# Patient Record
Sex: MALE
[De-identification: names, ages, dates, MRNs, and addresses within clinical notes are randomized per-mention and may not be internally consistent; named-entity substitution may affect disease eponyms.]

---

## 2019-04-25 PROBLEM — Z00.00 ENCOUNTER FOR PREVENTIVE HEALTH EXAMINATION: Status: ACTIVE | Noted: 2019-04-25

## 2020-05-02 ENCOUNTER — TRANSCRIPTION ENCOUNTER (OUTPATIENT)
Age: 45
End: 2020-05-02

## 2022-11-21 ENCOUNTER — APPOINTMENT (OUTPATIENT)
Dept: PULMONOLOGY | Facility: CLINIC | Age: 47
End: 2022-11-21

## 2022-11-21 VITALS
HEIGHT: 67 IN | HEART RATE: 74 BPM | SYSTOLIC BLOOD PRESSURE: 103 MMHG | DIASTOLIC BLOOD PRESSURE: 71 MMHG | OXYGEN SATURATION: 97 % | BODY MASS INDEX: 28.25 KG/M2 | RESPIRATION RATE: 14 BRPM | WEIGHT: 180 LBS | TEMPERATURE: 98.2 F

## 2022-11-21 DIAGNOSIS — R05.3 CHRONIC COUGH: ICD-10-CM

## 2022-11-21 DIAGNOSIS — N40.0 BENIGN PROSTATIC HYPERPLASIA WITHOUT LOWER URINARY TRACT SYMPMS: ICD-10-CM

## 2022-11-21 DIAGNOSIS — L40.9 PSORIASIS, UNSPECIFIED: ICD-10-CM

## 2022-11-21 DIAGNOSIS — Z78.9 OTHER SPECIFIED HEALTH STATUS: ICD-10-CM

## 2022-11-21 DIAGNOSIS — Z92.89 PERSONAL HISTORY OF OTHER MEDICAL TREATMENT: ICD-10-CM

## 2022-11-21 DIAGNOSIS — Z80.7 FAMILY HISTORY OF OTHER MALIGNANT NEOPLASMS OF LYMPHOID, HEMATOPOIETIC AND RELATED TISSUES: ICD-10-CM

## 2022-11-21 DIAGNOSIS — E78.5 HYPERLIPIDEMIA, UNSPECIFIED: ICD-10-CM

## 2022-11-21 PROCEDURE — 99203 OFFICE O/P NEW LOW 30 MIN: CPT | Mod: 25

## 2022-11-21 PROCEDURE — 94060 EVALUATION OF WHEEZING: CPT

## 2022-11-21 PROCEDURE — 95012 NITRIC OXIDE EXP GAS DETER: CPT

## 2022-11-21 PROCEDURE — 94729 DIFFUSING CAPACITY: CPT

## 2022-11-21 PROCEDURE — 94726 PLETHYSMOGRAPHY LUNG VOLUMES: CPT

## 2022-11-21 RX ORDER — TAMSULOSIN HYDROCHLORIDE 0.4 MG/1
0.4 CAPSULE ORAL
Refills: 0 | Status: ACTIVE | COMMUNITY

## 2022-11-21 NOTE — REASON FOR VISIT
[Cough] : cough [Consultation] : a consultation [Asthma] : asthma [Shortness of Breath] : shortness of breath

## 2022-11-21 NOTE — ASSESSMENT
[FreeTextEntry1] : In summary the patient is a 47-year-old male with past medical history significant for psoriasis obstructive sleep apnea currently on CPAP who presents for pulmonary consult.  The patient's physical exam is within normal limits.\par \par He underwent a pulmonary function test which revealed mild hyperinflation.  FeNO was 26 ppb.  The patient is started on Trelegy for possible intermittent asthma and is instructed to follow-up in 2 weeks

## 2022-11-21 NOTE — HISTORY OF PRESENT ILLNESS
[Never] : never [TextBox_4] : Patient is a 47 year old male with history of BPH, Hyperlipidemia, Psoriasis, +ve PPD treated with INH about 10 years ago and Sleep apnea on CPAP at night presents for pulmonary evaluation \par \par Patient states he has been experiencing cough on/off for the last 6 months. He reports cough seems to be worse with the cold air. He has history of sleep apnea and uses CPAP about 6-7 hours nightly. Denies fevers, chills chest pain N/V or abdominal pain + chest tightness

## 2022-12-07 ENCOUNTER — APPOINTMENT (OUTPATIENT)
Dept: PULMONOLOGY | Facility: CLINIC | Age: 47
End: 2022-12-07